# Patient Record
Sex: FEMALE | Race: WHITE | NOT HISPANIC OR LATINO | URBAN - METROPOLITAN AREA
[De-identification: names, ages, dates, MRNs, and addresses within clinical notes are randomized per-mention and may not be internally consistent; named-entity substitution may affect disease eponyms.]

---

## 2018-11-06 ENCOUNTER — OFFICE VISIT (OUTPATIENT)
Dept: URBAN - METROPOLITAN AREA CLINIC 18 | Facility: CLINIC | Age: 73
End: 2018-11-06
Payer: COMMERCIAL

## 2018-11-06 DIAGNOSIS — H52.03 HYPERMETROPIA, BILATERAL: Primary | ICD-10-CM

## 2018-11-06 PROCEDURE — 92014 COMPRE OPH EXAM EST PT 1/>: CPT | Performed by: OPTOMETRIST

## 2018-11-06 PROCEDURE — 92015 DETERMINE REFRACTIVE STATE: CPT | Performed by: OPTOMETRIST

## 2018-11-06 ASSESSMENT — KERATOMETRY
OS: 42.00
OD: 42.50

## 2018-11-06 ASSESSMENT — INTRAOCULAR PRESSURE
OS: 13
OD: 14

## 2018-11-06 ASSESSMENT — VISUAL ACUITY
OD: 20/20
OS: 20/20-

## 2018-11-06 NOTE — IMPRESSION/PLAN
Impression: Hypermetropia, bilateral: H52.03. Condition: established, stable. Plan: Discussed diagnosis with patient. New glasses Rx given today.

## 2020-03-05 ENCOUNTER — OFFICE VISIT (OUTPATIENT)
Dept: URBAN - METROPOLITAN AREA CLINIC 18 | Facility: CLINIC | Age: 75
End: 2020-03-05
Payer: COMMERCIAL

## 2020-03-05 PROCEDURE — 92012 INTRM OPH EXAM EST PATIENT: CPT | Performed by: OPTOMETRIST

## 2020-03-05 ASSESSMENT — VISUAL ACUITY
OD: 20/20
OS: 20/25

## 2020-03-05 ASSESSMENT — INTRAOCULAR PRESSURE
OS: 13
OD: 13

## 2020-03-05 ASSESSMENT — KERATOMETRY
OS: 41.88
OD: 42.38

## 2021-04-29 ENCOUNTER — OFFICE VISIT (OUTPATIENT)
Dept: URBAN - METROPOLITAN AREA CLINIC 18 | Facility: CLINIC | Age: 76
End: 2021-04-29
Payer: COMMERCIAL

## 2021-04-29 DIAGNOSIS — H25.13 AGE-RELATED NUCLEAR CATARACT, BILATERAL: ICD-10-CM

## 2021-04-29 DIAGNOSIS — H52.4 PRESBYOPIA: Primary | ICD-10-CM

## 2021-04-29 PROCEDURE — 92012 INTRM OPH EXAM EST PATIENT: CPT | Performed by: OPTOMETRIST

## 2021-04-29 ASSESSMENT — KERATOMETRY
OS: 41.75
OD: 42.38

## 2021-04-29 ASSESSMENT — VISUAL ACUITY
OD: 20/20
OS: 20/20

## 2021-04-29 NOTE — IMPRESSION/PLAN
Impression: Presbyopia: H52.4 Bilateral. Plan: Refractive error accounts for symptoms. Release SRX. RTC 1 year x CEE.

## 2022-08-15 ENCOUNTER — OFFICE VISIT (OUTPATIENT)
Dept: URBAN - METROPOLITAN AREA CLINIC 18 | Facility: CLINIC | Age: 77
End: 2022-08-15
Payer: COMMERCIAL

## 2022-08-15 DIAGNOSIS — H10.45 OTHER CHRONIC ALLERGIC CONJUNCTIVITIS: ICD-10-CM

## 2022-08-15 DIAGNOSIS — H25.813 COMBINED FORMS OF AGE-RELATED CATARACT, BILATERAL: Primary | ICD-10-CM

## 2022-08-15 DIAGNOSIS — H04.123 DRY EYE SYNDROME OF BILATERAL LACRIMAL GLANDS: ICD-10-CM

## 2022-08-15 PROCEDURE — 99213 OFFICE O/P EST LOW 20 MIN: CPT | Performed by: OPTOMETRIST

## 2022-08-15 ASSESSMENT — INTRAOCULAR PRESSURE
OD: 17
OS: 12

## 2022-08-15 NOTE — IMPRESSION/PLAN
Impression: Other chronic allergic conjunctivitis: H10.45. Plan: Patient educated that symptoms are caused by ocular allergies and that treatment will help alleviate symptoms but that it will not prevent allergies. Patient educated that allergy testing with an allergy specialist may be necessary to identify allergens affecting patient and treat condition. May use Lastacaft or Pataday an OTC for ocular itch.

## 2022-08-15 NOTE — IMPRESSION/PLAN
Impression: Combined forms of age-related cataract, bilateral: H25.813. Plan: Cataracts account for the patient's complaints. Patient education was discussed regarding cataracts, lens options and surgery. Recommend cataract consult with surgeon. Pt would like to wait on Sx will contact when she is ready to proceed.

## 2022-08-15 NOTE — IMPRESSION/PLAN
Impression: Dry eye syndrome of bilateral lacrimal glands: H04.123. Plan: Discussed diagnosis with patient. Recommend artificial tears in OU for comfort, 3-4x's a day. Recommend Refresh or Systane.

## 2022-08-16 ENCOUNTER — OFFICE VISIT (OUTPATIENT)
Dept: URBAN - METROPOLITAN AREA CLINIC 18 | Facility: CLINIC | Age: 77
End: 2022-08-16
Payer: COMMERCIAL

## 2022-08-16 DIAGNOSIS — H52.4 PRESBYOPIA: Primary | ICD-10-CM

## 2022-08-16 PROCEDURE — 92012 INTRM OPH EXAM EST PATIENT: CPT | Performed by: OPTOMETRIST

## 2022-08-16 ASSESSMENT — VISUAL ACUITY
OD: 20/25
OS: 20/25

## 2022-08-16 NOTE — IMPRESSION/PLAN
Impression: Presbyopia: H52.4. Plan: Finalized New [employer]. Patient education on appropriate options of eye glasses. Return to clinic in one year for complete eye exam and refraction.